# Patient Record
Sex: FEMALE | Race: WHITE
[De-identification: names, ages, dates, MRNs, and addresses within clinical notes are randomized per-mention and may not be internally consistent; named-entity substitution may affect disease eponyms.]

---

## 2018-09-06 ENCOUNTER — HOSPITAL ENCOUNTER (EMERGENCY)
Dept: HOSPITAL 97 - ER | Age: 43
Discharge: ANOTHER HOSPITAL | End: 2018-09-06
Payer: COMMERCIAL

## 2018-09-06 VITALS — OXYGEN SATURATION: 99 %

## 2018-09-06 VITALS — SYSTOLIC BLOOD PRESSURE: 112 MMHG | TEMPERATURE: 98.3 F | DIASTOLIC BLOOD PRESSURE: 68 MMHG

## 2018-09-06 DIAGNOSIS — I60.9: Primary | ICD-10-CM

## 2018-09-06 LAB
BLD SMEAR INTERP: (no result)
BUN BLD-MCNC: 14 MG/DL (ref 7–18)
GLUCOSE SERPLBLD-MCNC: 113 MG/DL (ref 74–106)
HCT VFR BLD CALC: 42.8 % (ref 36–45)
INR BLD: 1
LYMPHOCYTES # SPEC AUTO: 0.7 K/UL (ref 0.7–4.9)
MAGNESIUM SERPL-MCNC: 2.1 MG/DL (ref 1.8–2.4)
MCH RBC QN AUTO: 27.4 PG (ref 27–35)
MCV RBC: 82.9 FL (ref 80–100)
MORPHOLOGY BLD-IMP: (no result)
PMV BLD: 9.4 FL (ref 7.6–11.3)
POTASSIUM SERPL-SCNC: 3.8 MMOL/L (ref 3.5–5.1)
RBC # BLD: 5.17 M/UL (ref 3.86–4.86)

## 2018-09-06 PROCEDURE — 96375 TX/PRO/DX INJ NEW DRUG ADDON: CPT

## 2018-09-06 PROCEDURE — 93005 ELECTROCARDIOGRAM TRACING: CPT

## 2018-09-06 PROCEDURE — 85025 COMPLETE CBC W/AUTO DIFF WBC: CPT

## 2018-09-06 PROCEDURE — 85730 THROMBOPLASTIN TIME PARTIAL: CPT

## 2018-09-06 PROCEDURE — 96374 THER/PROPH/DIAG INJ IV PUSH: CPT

## 2018-09-06 PROCEDURE — 99285 EMERGENCY DEPT VISIT HI MDM: CPT

## 2018-09-06 PROCEDURE — 36415 COLL VENOUS BLD VENIPUNCTURE: CPT

## 2018-09-06 PROCEDURE — 70450 CT HEAD/BRAIN W/O DYE: CPT

## 2018-09-06 PROCEDURE — 85610 PROTHROMBIN TIME: CPT

## 2018-09-06 PROCEDURE — 83735 ASSAY OF MAGNESIUM: CPT

## 2018-09-06 PROCEDURE — 80048 BASIC METABOLIC PNL TOTAL CA: CPT

## 2018-09-06 NOTE — RAD REPORT
EXAM DESCRIPTION:  CT - Head Brain Wo Cont - 9/6/2018 11:02 am

 

CLINICAL HISTORY:  HEADACHE

 

COMPARISON:  No comparisons

 

TECHNIQUE:  All CT scans are performed using dose optimization technique as appropriate and may inclu
de automated exposure control or mA/KV adjustment according to patient size.

 

FINDINGS:  Acute subarachnoid hemorrhage is seen along the brainstem and foramen magnum.Mild prominen
ce of the ventricular system suggests early hydrocephalus.No midline shift.

 

The paranasal sinuses and mastoids are clear. The calvarium is intact.

 

IMPRESSION:  Acute subarachnoid hemorrhage is seen predominately surrounding the brainstem.

 

Findings were discussed Dr. Arevalo in emergency room 11:20 a.m. 09/06/2018 by telephone.

## 2018-09-06 NOTE — ER
Nurse's Notes                                                                                     

 [facility]                                                                

Name: Honey Beltre                                                                                 

Age: 43 yrs                                                                                       

Sex: Female                                                                                       

: 1975                                                                                   

MRN: [id number]                                                                                   

Arrival Date: 2018                                                                          

Time: 10:33                                                                                       

Account#: [id number]                                                                            

Bed 23                                                                                            

Private MD:                                                                                       

Diagnosis: Subarachnoid Hemorrhage                                                                

                                                                                                  

Presentation:                                                                                     

                                                                                             

10:33 Presenting complaint: EMS states: headache since 810, was on an elliptical and got a   ch  

      headache and nausea, at home took 1000mg tylenol, then called EMS at 1000 because of        

      sever headache. pt given 4 mg zofran in route, temp 98.7 BGL 88.                            

10:45 Transition of care: patient was not received from another setting of care. Onset of     ch  

      symptoms was 2018 at 08:10. Risk Assessment: Do you want to hurt yourself     

      or someone else? Patient reports no desire to harm self or others. Initial Sepsis           

      Screen: Does the patient meet any 2 criteria? No. Patient's initial sepsis screen is        

      negative. Does the patient have a suspected source of infection? No. Patient's initial      

      sepsis screen is negative. Care prior to arrival: IV initiated. 20 GA, in the right         

      antecubital area, Glucose check: 88 4 zofran.                                               

10:45 Method Of Arrival: EMS: [facility]  

10:45 Acuity: ADDY 3                                                                           ch  

                                                                                                  

Triage Assessment:                                                                                

10:45 Headache History: Denies prior headaches. General: Appears in no apparent distress.     ch  

      uncomfortable, Behavior is cooperative, appropriate for age. Pain: Complains of pain in     

      occipital area and base of the skull Pain currently is 10 out of 10 on a pain scale.        

      Pain began suddenly, Also complains of nausea. Neuro: Level of Consciousness is awake,      

      alert, obeys commands, Oriented to person, place, time, situation,  are equal          

      bilaterally Moves all extremities. Full function Gait is steady, Speech is normal,          

      Facial symmetry appears normal, Facial symmetry: tongue is midline, Pupils are PERRLA,      

      Reports headache occipital area. Respiratory: Airway is patent Respiratory effort is        

      even, unlabored, Breath sounds are clear bilaterally. GI: Pt is actively vomiting           

      Reports nausea, vomiting.                                                                   

                                                                                                  

Historical:                                                                                       

- Allergies:                                                                                      

10:35 No Known Allergies;                                                                     ch  

- Home Meds:                                                                                      

10:35 dupixent- shot for exezma [Active];                                                     ch  

- PMHx:                                                                                           

10:35 ezema;                                                                                  ch  

- PSHx:                                                                                           

10:49 lap band;                                                                                 

                                                                                                  

- Immunization history:: Adult Immunizations up to date, Flu vaccine is up to date.               

- Social history:: Smoking status: Patient/guardian denies using tobacco,                         

  Patient/guardian denies using alcohol, street drugs.                                            

- Ebola Screening: : Patient negative for fever greater than or equal to 101.5 degrees            

  Fahrenheit, and additional compatible Ebola Virus Disease symptoms Patient denies               

  exposure to infectious person Patient denies travel to an Ebola-affected area in the            

  21 days before illness onset No symptoms or risks identified at this time.                      

                                                                                                  

                                                                                                  

Screenin:21 Abuse screen: Denies threats or abuse. Denies injuries from another. Nutritional        ch  

      screening: No deficits noted. Tuberculosis screening: No symptoms or risk factors           

      identified. Fall Risk None identified.                                                      

                                                                                                  

Assessment:                                                                                       

11:00 Reassessment: Patient appears in no apparent distress at this time. No changes from       

      previously documented assessment. Patient and/or family updated on plan of care and         

      expected duration. Pain level reassessed. Patient is alert, oriented x 3, equal             

      unlabored respirations, skin warm/dry/pink.                                                 

11:22 Reassessment: pt made NPO, pt verb understanding of results.                              

11:54 Reassessment: report called to Alex Schaffer. pt verb understanding of transfer. pt GCS      

      still 15. General: Appears. Pain: Complains of pain in back of head and occipital area      

      Pain currently is 8 out of 10 on a pain scale.                                              

12:21 Reassessment: Patient appears in no apparent distress at this time. Patient and/or        

      family updated on plan of care and expected duration. Pain level reassessed. Patient is     

      alert, oriented x 3, equal unlabored respirations, skin warm/dry/pink. pt states the        

      pain and nausea is better. GCS 15. no s/s of distress. report givent o [facility] at       

      bedside, repot called to [facility]. family verb understanding of where to go and to be      

      safe driving there. no s/s of distress.                                                     

                                                                                                  

Vital Signs:                                                                                      

10:45  / 86; Pulse 84; Resp 16; Temp 98.6(O); Pulse Ox 99% on R/A; Weight 90.72 kg;       

      Height 5 ft. 8 in. (172.72 cm); Pain 10/10;                                                 

11:54  / 86; Pulse 102; Resp 16; Temp 98.2; Pulse Ox 100% on R/A; Pain 2/10;            ch  

12:21  / 68; Pulse 102; Resp 16; Temp 98.3; Pulse Ox 99% on R/A; Pain 5/10;             ch  

10:45 Body Mass Index 30.41 (90.72 kg, 172.72 cm)                                             ch  

                                                                                                  

[place] Coma Score:                                                                               

11:08 Eye Response: spontaneous(4). Verbal Response: oriented(5). Motor Response: obeys       cp  

      commands(6). Total: 15.                                                                     

                                                                                                  

NIH Stroke Scale Scores:                                                                          

11:08 NIHSS Score: 0                                                                          cp  

                                                                                                  

ED Course:                                                                                        

10:33 Patient arrived in ED.                                                                  ch  

10:40 No provider procedures requiring assistance completed. Inserted saline lock: 20 gauge   ch  

      in left forearm, using aseptic technique. Blood collected.                                  

10:40 Maintain EMS IV. Dressing intact. Good blood return noted. Site clean \T\ dry. Gauge \T\    ch

      site: 20 R AC. Patient transferred, IV remains in place.                                    

10:45 Young Whitlock PA is PHCP.                                                                cp  

10:45 Kang Arevalo MD is Attending Physician.                                              cp  

10:45 Arm band placed on left wrist. Patient placed in an exam room, on a stretcher, on pulse ch  

      oximetry.                                                                                   

10:46 Triage completed.                                                                       ch  

10:50 Patient has correct armband on for positive identification. Placed in gown. Bed in low  ch  

      position. Call light in reach. Side rails up X2. Cardiac monitor on. Pulse ox on. NIBP      

      on.                                                                                         

11:01 CT completed. Patient tolerated procedure well. Patient moved to CT via wheelchair.     sj  

      Patient moved back from CT.                                                                 

11:01 CT Head Brain wo Cont In Process Unspecified.                                           EDMS

11:26 Carolyn Chavez, RN is Primary Nurse.                                                ch  

11:45 EKG done, by EKG tech. reviewed by Young BARGER.                                       dt2 

                                                                                                  

Administered Medications:                                                                         

11:21 CANCELLED (Physician Discretion): Reglan 20 mg IVP once; over 15 mins                   cp  

11:21 CANCELLED (Physician Discretion): Benadryl 25 mg IVP once                               cp  

11:30 Drug: NS 0.9% 1000 ml Route: IV; Rate: 1 bolus; Site: right antecubital;                ch  

11:52 Follow up: IV Status: Infusion continued upon admission; IV Intake: 1000ml              ch  

11:30 Drug: Demerol - Meperidine 12.5 mg Route: IVP; Site: right antecubital;                 ch  

11:53 Follow up: Response: No adverse reaction; Marked relief of symptoms                     ch  

12:27 Follow up: Response: No adverse reaction; Marked relief of symptoms                     ch  

11:30 Drug: Ativan 1 mg Route: IVP; Site: right antecubital;                                  ch  

12:27 Follow up: Response: No adverse reaction; Marked relief of symptoms                     ch  

11:30 Drug: Zofran 4 mg Route: IVP; Site: right antecubital;                                  ch  

12:27 Follow up: Response: No adverse reaction; Marked relief of symptoms                     ch  

11:45 Drug: Zofran 4 mg Route: IVP; Site: right antecubital;                                  ch  

12:28 Follow up: Response: No adverse reaction; Marked relief of symptoms                     ch  

                                                                                                  

                                                                                                  

Intake:                                                                                           

11:52 IV: 1000ml; Total: 1000ml.                                                              ch  

                                                                                                  

Outcome:                                                                                          

10:40 Transferred by helicopter to [facility], Transfer form completed.      

      X-rays sent w/ patient.                                                                     

10:40 critical                                                                                    

10:40 Instructed on the need for transfer.                                                        

11:37 ER care complete, transfer ordered by MD.                                               cp  

12:28 Patient left the ED.                                                                      

                                                                                                  

                                                                                                  

NIH Stroke Scale - NIH Stroke Score                                                               

Date: 2018                                                                                  

Time: 11:08                                                                                       

Total Score = 0                                                                                   

  1a. Level of Consciousness (LOC) - 0(Alert)                                                     

  1b. Level of Consciousness (LOC) (Year \T\ Age) - 0(Both)                                       

  1c. LOC Commands (Open \T\ Closes Eyes/) - 0(Both)                                          

   2. Best Gaze (Lateral Gaze Paresis) - 0(Normal)                                                

   3. Visual Field Loss - 0(No visual loss)                                                       

   4. Facial Palsy - 0(Normal)                                                                    

  5a. Left Arm: Motor (10-second hold) - 0(No drift)                                              

  5b. Right Arm: Motor (10-second hold) - 0(No drift)                                             

  6a. Left Leg: Motor (5-second hold - always test supine) - 0(No drift)                          

  6b. Right Leg: Motor (5-second hold - always test supine) - 0(No drift)                         

   7. Limb Ataxia (finger/nose \T\ heel/shin - test with eyes open) - 0(Absent)                   

   8. Sensory Loss (pinprick arms/legs/face) - 0(Normal)                                          

   9. Best Language: Aphasia (description/naming/reading) - 0(No aphasia)                         

  10. Dysarthria (speech clarity - read or repeat words) - 0(Normal)                              

  11. Extinction and Inattention (visual/tactile/auditory/spatial/personal) - 0(No                

      abnormality)                                                                                

Initials: cp                                                                                      

                                                                                                  

Signatures:                                                                                       

Dispatcher [employer]                           Carolyn Calix, OMA                  RN   adamaris Long, Young Rodriguez PA PA cp Teague, Danielle                             dt2                                                  

                                                                                                  

**************************************************************************************************

## 2018-09-06 NOTE — EDPHYS
Physician Documentation                                                                           

 [facility]                                                                

Name: Honey Beltre                                                                                 

Age: 43 yrs                                                                                       

Sex: Female                                                                                       

: 1975                                                                                   

MRN: [id number]                                                                                   

Arrival Date: 2018                                                                          

Time: 10:33                                                                                       

Account#: [id number]                                                                            

Bed 23                                                                                            

Private MD:                                                                                       

ED Physician Kang Arevalo                                                                       

HPI:                                                                                              

                                                                                             

11:00 This 43 yrs old  Female presents to ER via EMS with complaints of Headache.    cp  

11:00 The patient complains of pain to the back of head. The patient describes the headache   cp  

      as aching, pounding, a pressure. Onset: The symptoms/episode began/occurred suddenly,       

      at 08:10. Associated signs and symptoms: Pertinent positives: nausea, Photophobia.          

      Severity of symptoms: in the emergency department the pain a " 10" out of "10".             

      Headache History: Other reports headache started suddenly after exercising on               

      elliptical machine. The patient has not experienced similar symptoms in the past.           

                                                                                                  

Historical:                                                                                       

- Allergies:                                                                                      

10:35 No Known Allergies;                                                                     ch  

- Home Meds:                                                                                      

10:35 dupixent- shot for exezma [Active];                                                     ch  

- PMHx:                                                                                           

10:35 ezema;                                                                                  ch  

- PSHx:                                                                                           

10:49 lap band;                                                                               ch  

                                                                                                  

- Immunization history:: Adult Immunizations up to date, Flu vaccine is up to date.               

- Social history:: Smoking status: Patient/guardian denies using tobacco,                         

  Patient/guardian denies using alcohol, street drugs.                                            

- Ebola Screening: : Patient negative for fever greater than or equal to 101.5 degrees            

  Fahrenheit, and additional compatible Ebola Virus Disease symptoms Patient denies               

  exposure to infectious person Patient denies travel to an Ebola-affected area in the            

  21 days before illness onset No symptoms or risks identified at this time.                      

                                                                                                  

                                                                                                  

ROS:                                                                                              

11:05 Constitutional: Negative for body aches, chills, fever, poor PO intake.                 cp  

11:05 ENT: Negative for injury, pain, and discharge.                                          cp  

11:05 Eyes: Positive for photophobia, Negative for discharge, redness, vision loss.               

11:05 Neck: Negative for pain with movement, pain at rest, stiffness, swollen nodes,              

      tenderness.                                                                                 

11:05 Cardiovascular: Negative for chest pain, edema, palpitations.                               

11:05 Respiratory: Negative for cough, shortness of breath, wheezing.                             

11:05 Abdomen/GI: Positive for nausea, Negative for abdominal pain, vomiting, diarrhea,           

      constipation, black/tarry stool, rectal bleeding.                                           

11:05 : Negative for urinary symptoms.                                                          

11:05 Skin: Negative for cellulitis, rash.                                                        

11:05 Neuro: Positive for headache, Negative for altered mental status, dizziness, gait           

      disturbance, numbness, seizure activity, syncope, near syncope, weakness.                   

11:05 All other systems are negative.                                                             

                                                                                                  

Exam:                                                                                             

11:08 Constitutional: The patient appears in no acute distress, alert, awake,                 cp  

      non-diaphoretic, non-toxic, well developed, well nourished, uncomfortable.                  

11:08 Head/Face:  Normocephalic, atraumatic. Eyes:  Pupils equal round and reactive to light, cp  

      extra-ocular motions intact.  Lids and lashes normal.  Conjunctiva and sclera are           

      non-icteric and not injected.  Cornea within normal limits.  Periorbital areas with no      

      swelling, redness, or edema. ENT:  Nares patent. No nasal discharge, no septal              

      abnormalities noted.  Tympanic membranes are normal and external auditory canals are        

      clear.  Oropharynx with no redness, swelling, or masses, exudates, or evidence of           

      obstruction, uvula midline.  Mucous membranes moist. Neck:  Trachea midline, no             

      thyromegaly or masses palpated, and no cervical lymphadenopathy.  Supple, full range of     

      motion without nuchal rigidity, or vertebral point tenderness.  No Meningismus.             

      Chest/axilla:  Normal chest wall appearance and motion.  Nontender with no deformity.       

      No lesions are appreciated.                                                                 

11:08 Cardiovascular: Rate: normal, Rhythm: regular, Pulses: Pulses are 2+ in right radial        

      artery and left radial artery. Heart sounds: murmur, not appreciated, Edema: is not         

      appreciated, JVD: is not appreciated.                                                       

11:08 Respiratory: the patient does not display signs of respiratory distress,  Respirations:     

      normal, no use of accessory muscles, no retractions, labored breathing, is not present,     

      Breath sounds: are clear throughout, no decreased breath sounds, no stridor, no             

      wheezing.                                                                                   

11:08 Abdomen/GI: Inspection: abdomen appears normal, Bowel sounds: active, all quadrants,        

      Palpation: abdomen is soft and non-tender, in all quadrants, rebound tenderness, is not     

      appreciated, voluntary guarding, is not appreciated, involuntary guarding, is not           

      appreciated.                                                                                

11:08 Back: pain, is absent, ROM is normal.                                                       

11:08 Skin: cellulitis, is not appreciated, no rash present.                                      

11:08 Neuro: Orientation: to person, place \T\ time. Mentation: is normal, Cerebellar function:   

      Romberg testing is negative, normal finger to nose testing, Motor: moves all fours,         

      strength is normal, Sensation: no obvious gross deficits.                                   

11:40 ECG was reviewed by the Attending Physician.                                            cp  

                                                                                                  

Vital Signs:                                                                                      

10:45  / 86; Pulse 84; Resp 16; Temp 98.6(O); Pulse Ox 99% on R/A; Weight 90.72 kg;     ch  

      Height 5 ft. 8 in. (172.72 cm); Pain 10/10;                                                 

11:54  / 86; Pulse 102; Resp 16; Temp 98.2; Pulse Ox 100% on R/A; Pain 2/10;            ch  

12:21  / 68; Pulse 102; Resp 16; Temp 98.3; Pulse Ox 99% on R/A; Pain 5/10;             ch  

10:45 Body Mass Index 30.41 (90.72 kg, 172.72 cm)                                               

                                                                                                  

NIH Stroke Scale Scores:                                                                          

11:08 NIHSS Score: 0                                                                          cp  

                                                                                                  

Blount Coma Score:                                                                               

11:08 Eye Response: spontaneous(4). Verbal Response: oriented(5). Motor Response: obeys       cp  

      commands(6). Total: 15.                                                                     

                                                                                                  

MDM:                                                                                              

10:45 Patient medically screened.                                                             cp  

11:30 Data reviewed: vital signs, nurses notes, radiologic studies, CT scan. Physician        cp  

      consultation: DR Fishman, neurology \T\[facility], will accept patient to neuro ICU.                

                                                                                                  

                                                                                             

11:13 Order name: CBC with Diff                                                               cp  

                                                                                             

11:13 Order name: BMP                                                                         cp  

                                                                                             

11:13 Order name: PT-INR                                                                      cp  

                                                                                             

11:13 Order name: Ptt, Activated                                                              cp  

                                                                                             

11:13 Order name: Magnesium                                                                   cp  

                                                                                             

12:01 Order name: CBC Smear Scan                                                              EDMS

                                                                                             

10:46 Order name: CT Head Brain wo Cont; Complete Time: 11:29                                 cp  

                                                                                             

10:46 Order name: Urine Dipstick-Ancillary (obtain specimen)                                  cp  

                                                                                             

10:46 Order name: Urine Pregnancy Test (obtain specimen)                                      cp  

                                                                                                  

EC:40 Rate is 80 beats/min. Rhythm is regular. [place] interval is normal. QRS interval is normal. cp  

      QT interval is normal. Interpreted by me. Reviewed by me.                                   

                                                                                                  

Administered Medications:                                                                         

11:21 CANCELLED (Physician Discretion): Reglan 20 mg IVP once; over 15 mins                   cp  

11:21 CANCELLED (Physician Discretion): Benadryl 25 mg IVP once                               cp  

11:30 Drug: NS 0.9% 1000 ml Route: IV; Rate: 1 bolus; Site: right antecubital;                ch  

11:52 Follow up: IV Status: Infusion continued upon admission; IV Intake: 1000ml              ch  

11:30 Drug: Demerol - Meperidine 12.5 mg Route: IVP; Site: right antecubital;                 ch  

11:53 Follow up: Response: No adverse reaction; Marked relief of symptoms                     ch  

12:27 Follow up: Response: No adverse reaction; Marked relief of symptoms                     ch  

11:30 Drug: Ativan 1 mg Route: IVP; Site: right antecubital;                                  ch  

12:27 Follow up: Response: No adverse reaction; Marked relief of symptoms                     ch  

11:30 Drug: Zofran 4 mg Route: IVP; Site: right antecubital;                                  ch  

12:27 Follow up: Response: No adverse reaction; Marked relief of symptoms                     ch  

11:45 Drug: Zofran 4 mg Route: IVP; Site: right antecubital;                                  ch  

12:28 Follow up: Response: No adverse reaction; Marked relief of symptoms                     ch  

                                                                                                  

                                                                                                  

Disposition:                                                                                      

13:57 Co-signature as Attending Physician, Kang Arevalo MD I agree with the assessment and   kdr 

      plan of care.                                                                               

                                                                                                  

Disposition:                                                                                      

18 11:37 Transfer ordered to [facility]. Diagnosis is Subarachnoid      

  Hemorrhage.                                                                                     

- Reason for transfer: Higher level of care.                                                      

- Accepting physician is Kye.                                                                     

- Condition is Stable.                                                                            

- Problem is new.                                                                                 

- Symptoms have improved.                                                                         

                                                                                                  

                                                                                                  

                                                                                                  

                                                                                                  

NIH Stroke Scale - NIH Stroke Score                                                               

Date: 2018                                                                                  

Time: 11:08                                                                                       

Total Score = 0                                                                                   

  1a. Level of Consciousness (LOC) - 0(Alert)                                                     

  1b. Level of Consciousness (LOC) (Year \T\ Age) - 0(Both)                                       

  1c. LOC Commands (Open \T\ Closes Eyes/) - 0(Both)                                          

   2. Best Gaze (Lateral Gaze Paresis) - 0(Normal)                                                

   3. Visual Field Loss - 0(No visual loss)                                                       

   4. Facial Palsy - 0(Normal)                                                                    

  5a. Left Arm: Motor (10-second hold) - 0(No drift)                                              

  5b. Right Arm: Motor (10-second hold) - 0(No drift)                                             

  6a. Left Leg: Motor (5-second hold - always test supine) - 0(No drift)                          

  6b. Right Leg: Motor (5-second hold - always test supine) - 0(No drift)                         

   7. Limb Ataxia (finger/nose \T\ heel/shin - test with eyes open) - 0(Absent)                   

   8. Sensory Loss (pinprick arms/legs/face) - 0(Normal)                                          

   9. Best Language: Aphasia (description/naming/reading) - 0(No aphasia)                         

  10. Dysarthria (speech clarity - read or repeat words) - 0(Normal)                              

  11. Extinction and Inattention (visual/tactile/auditory/spatial/personal) - 0(No                

      abnormality)                                                                                

Initials: cp                                                                                      

                                                                                                  

Signatures:                                                                                       

Dispatcher [employer]                           Carolyn Calix, OMA                  RN   ch                                                   

Kang Arevalo MD MD   kdr                                                  

Young Whitlock PA                         PA   cp                                                   

                                                                                                  

Corrections: (The following items were deleted from the chart)                                    

11:21 11:13 Reglan 20 mg IVP once; over 15 mins ordered. cp                           cp          

11:21 11:13 Benadryl 25 mg IVP once ordered. cp                                       cp          

12:28 11:37 2018 11:37 Transfer ordered to [facility].                

      Diagnosis is Subarachnoid Hemorrhage. Reason for transfer: Higher level of                  

      care. Accepting physician is Kye. Condition is Stable. Problem is new. Symptoms             

      have improved. cp                                                                           

                                                                                                  

**************************************************************************************************

## 2018-09-07 NOTE — EKG
Test Date:    2018-09-06               Test Time:    11:39:10

Technician:   ADRIA                                     

                                                     

MEASUREMENT RESULTS:                                       

Intervals:                                           

Rate:         80                                     

MO:           132                                    

QRSD:         80                                     

QT:           400                                    

QTc:          461                                    

Axis:                                                

P:            38                                     

MO:           132                                    

QRS:          -8                                     

T:            2                                      

                                                     

INTERPRETIVE STATEMENTS:                                       

                                                     

Normal sinus rhythm

Inferior infarct, age undetermined

Cannot rule out Anterior infarct, age undetermined

Abnormal ECG

Compared to ECG 12/01/2014 10:19:06

No significant changes



Electronically Signed On 09-07-18 07:34:29 CDT by Rich Minor